# Patient Record
Sex: MALE | Race: OTHER | NOT HISPANIC OR LATINO
[De-identification: names, ages, dates, MRNs, and addresses within clinical notes are randomized per-mention and may not be internally consistent; named-entity substitution may affect disease eponyms.]

---

## 2017-02-10 ENCOUNTER — APPOINTMENT (OUTPATIENT)
Dept: HEMATOLOGY ONCOLOGY | Facility: CLINIC | Age: 67
End: 2017-02-10

## 2024-03-15 ENCOUNTER — APPOINTMENT (OUTPATIENT)
Dept: ORTHOPEDIC SURGERY | Facility: CLINIC | Age: 74
End: 2024-03-15
Payer: MEDICARE

## 2024-03-15 DIAGNOSIS — Z96.641 PRESENCE OF RIGHT ARTIFICIAL HIP JOINT: ICD-10-CM

## 2024-03-15 DIAGNOSIS — Z96.642 PRESENCE OF LEFT ARTIFICIAL HIP JOINT: ICD-10-CM

## 2024-03-15 PROCEDURE — 73560 X-RAY EXAM OF KNEE 1 OR 2: CPT | Mod: 50

## 2024-03-15 PROCEDURE — 99213 OFFICE O/P EST LOW 20 MIN: CPT

## 2024-03-15 PROCEDURE — 73502 X-RAY EXAM HIP UNI 2-3 VIEWS: CPT

## 2024-03-15 NOTE — HISTORY OF PRESENT ILLNESS
[de-identified] : f/u of bilateral hips and knees doing well admits to some weight gain  no pain with prom incisions well healed  Imaging: X-rays were reviewed with the patient.   AP and Lateral views were obtained of the hips, including the contralateral side for comparison purposes. X-rays are negative for acute bone or soft tissue trauma. b/l sunshine in good position  Imaging: X-rays were reviewed with the patient.   AP and Lateral views were obtained of the knees, including the contralateral side for comparison purposes. X-rays are negative for acute bone or soft tissue trauma.  impression  doing well f/u in 2 years for routine f/u